# Patient Record
Sex: FEMALE | Race: WHITE | Employment: FULL TIME | URBAN - METROPOLITAN AREA
[De-identification: names, ages, dates, MRNs, and addresses within clinical notes are randomized per-mention and may not be internally consistent; named-entity substitution may affect disease eponyms.]

---

## 2022-12-12 NOTE — PROGRESS NOTES
Binta Munroe (: 1970) is a 45 y.o. female, patient, here for evaluation of the following chief complaint(s):  Shoulder Pain (right)       HPI:    She began having increased right shoulder pain approximately 2 months ago. The patient reports no specific injury but states that her pain came on suddenly. She describes her right shoulder pain as moderate, sharp, and intermittent. Her right shoulder pain continues to get worse. She reports taking no medication for discomfort. She was not seen in the emergency room for right shoulder pain and reports no previous or related right shoulder surgery. Allergies   Allergen Reactions    Darvocet A500 [Propoxyphene N-Acetaminophen] Nausea Only       Current Outpatient Medications   Medication Sig    methylPREDNISolone (MEDROL DOSEPACK) 4 mg tablet Per dose pack instructions    HYDROcodone-acetaminophen (VICODIN) 5-500 mg per tablet Take 1-2 Tabs by mouth every four (4) hours as needed for Pain.    sodium bicarb-sodium chloride (NEILMED SINUS) Kit 1 Bottle by Nasal route two (2) times a day. Use as directed with distilled water - no need to boil    carvedilol (COREG) 3.125 mg tablet Take 3.125 mg by mouth two (2) times daily (with meals). No current facility-administered medications for this visit. Past Medical History:   Diagnosis Date    Chronic HF (heart failure) (HCC)     cardio myopathy    Other ill-defined conditions(799.89)     amniotic embolism furing child birth        Past Surgical History:   Procedure Laterality Date    HX ORTHOPAEDIC      knee, tendon, wrist       History reviewed. No pertinent family history.      Social History     Socioeconomic History    Marital status:      Spouse name: Not on file    Number of children: Not on file    Years of education: Not on file    Highest education level: Not on file   Occupational History    Not on file   Tobacco Use    Smoking status: Never    Smokeless tobacco: Not on file Substance and Sexual Activity    Alcohol use: Yes     Alcohol/week: 0.8 standard drinks     Types: 1 Glasses of wine per week     Comment: daily    Drug use: No    Sexual activity: Not on file   Other Topics Concern    Not on file   Social History Narrative    Not on file     Social Determinants of Health     Financial Resource Strain: Not on file   Food Insecurity: Not on file   Transportation Needs: Not on file   Physical Activity: Not on file   Stress: Not on file   Social Connections: Not on file   Intimate Partner Violence: Not on file   Housing Stability: Not on file       Review of Systems   All other systems reviewed and are negative. Vitals:  Ht 5' 3\" (1.6 m)   Wt 115 lb (52.2 kg)   BMI 20.37 kg/m²    Body mass index is 20.37 kg/m². Ortho Exam     The patient is well-developed and well-nourished. The patient presents today in alert and oriented x3 with a normal mood and affect. The patient stands with a normal weightbearing line and walks with a normal gait. Right shoulder, the patient sits with the scapula protracted and depressed. They are tender to palpation over the trapezius and rhomboids as well as the anterior aspect of the supraspinatus and biceps. They are non-tender to palpation over the neck, clavicle, scapula, and elbow. There is no soft tissue swelling and eccymosis. The patient has limited range of motion of the shoulder in all planes secondary to discomfort. The patient is unable to tolerate stability testing. They have 5/5 strength at their side, and are neurovascularly intact distally. There is no erythema, warmth or skin lesions present. ASSESSMENT/PLAN:      1. Chronic right shoulder pain  -     XR SHOULDER RT AP/LAT MIN 2 V; Future  -     methylPREDNISolone (MEDROL DOSEPACK) 4 mg tablet; Per dose pack instructions, Normal, Disp-1 Dose Pack, R-0  2.  Pain of right shoulder region  -     XR SHOULDER RT AP/LAT MIN 2 V; Future  -     methylPREDNISolone (MEDROL DOSEPACK) 4 mg tablet; Per dose pack instructions, Normal, Disp-1 Dose Pack, R-0  3. Adhesive capsulitis of right shoulder  -     MRI SHOULDER RT WO CONT; Future  -     REFERRAL TO PHYSICAL THERAPY  4. Calcific tendonitis of right shoulder  -     MRI SHOULDER RT WO CONT; Future  -     REFERRAL TO PHYSICAL THERAPY     XR Results (most recent):  Results from Appointment encounter on 12/13/22    XR SHOULDER RT AP/LAT MIN 2 V    Narrative  Right shoulder 3 view x-ray show no evidence of fracture or dislocation. No evidence of significant impingement or AC joint arthritis. Evidence of a calcific tendinitis deposit in her supraspinatus. Below is the assessment and plan developed based on review of pertinent history, physical exam, labs, studies, and medications. **The patient was referred to formal physical therapy. **    We discussed the patient's right shoulder pain and her signs, symptoms, physical exam, description of her pain, and x-rays are consistent with adhesive capsulitis and a calcific tendinitis deposit in her supraspinatus tendon. The possible treatment options were discussed with the patient and because of the over 2-month long duration of her increased and worsening pain, no improvement with multiple modalities of conservative management including an at-home exercise program, her physical exam, description of her pain, x-rays, and her inability to complete daily living activities without significant discomfort we elected to obtain an MRI of her right shoulder to further evaluate the severity of her adhesive capsulitis and calcific tendinitis. The MRI images and results will be used in preoperative planning if and almost certainly when surgical intervention is necessary. The risks and benefits of the MRI were discussed in detail with the patient and she would like to proceed. We will schedule this at her convenience.   I will see her back after MRI is complete to discuss the images, results, and further treatment options. In the interim, I did encourage her to ice when possible, modify her activity level based on her right shoulder pain, and use her Medrol Dosepak as directed. The patient was given a prescription for Medrol Dosepak which she will use as instructed. While taking her Medrol Dosepak, the patient discontinue use of anti-inflammatory medication. Once her Medrol Dosepak is complete, she may resume anti-inflammatories at that time. The patient was also referred to formal physical therapy to work on range of motion, strengthening, and stretching exercises. She will also work on these exercises with an at-home exercise program as pain tolerates. I will see her back as noted above after right shoulder MRI is complete. **We will obtain an MRI for more information to determine the best treatment plan moving forward and help us prepare for surgical intervention if necessary. **    Return for After her right shoulder MRI is complete. An electronic signature was used to authenticate this note.   -- Mirta Newman MD

## 2022-12-13 ENCOUNTER — OFFICE VISIT (OUTPATIENT)
Dept: ORTHOPEDIC SURGERY | Age: 52
End: 2022-12-13
Payer: COMMERCIAL

## 2022-12-13 VITALS — WEIGHT: 115 LBS | BODY MASS INDEX: 20.38 KG/M2 | HEIGHT: 63 IN

## 2022-12-13 DIAGNOSIS — M25.511 CHRONIC RIGHT SHOULDER PAIN: Primary | ICD-10-CM

## 2022-12-13 DIAGNOSIS — M25.511 PAIN OF RIGHT SHOULDER REGION: ICD-10-CM

## 2022-12-13 DIAGNOSIS — G89.29 CHRONIC RIGHT SHOULDER PAIN: Primary | ICD-10-CM

## 2022-12-13 DIAGNOSIS — M75.01 ADHESIVE CAPSULITIS OF RIGHT SHOULDER: ICD-10-CM

## 2022-12-13 DIAGNOSIS — M75.31 CALCIFIC TENDONITIS OF RIGHT SHOULDER: ICD-10-CM

## 2022-12-13 PROCEDURE — 99214 OFFICE O/P EST MOD 30 MIN: CPT | Performed by: ORTHOPAEDIC SURGERY

## 2022-12-13 RX ORDER — METHYLPREDNISOLONE 4 MG/1
TABLET ORAL
Qty: 1 DOSE PACK | Refills: 0 | Status: SHIPPED | OUTPATIENT
Start: 2022-12-13

## 2022-12-21 ENCOUNTER — HOSPITAL ENCOUNTER (OUTPATIENT)
Dept: MRI IMAGING | Age: 52
Discharge: HOME | End: 2022-12-21
Attending: ORTHOPAEDIC SURGERY
Payer: COMMERCIAL

## 2022-12-21 DIAGNOSIS — M75.31 CALCIFIC TENDONITIS OF RIGHT SHOULDER: ICD-10-CM

## 2022-12-21 DIAGNOSIS — M75.01 ADHESIVE CAPSULITIS OF RIGHT SHOULDER: ICD-10-CM

## 2022-12-21 PROCEDURE — 73221 MRI JOINT UPR EXTREM W/O DYE: CPT

## 2022-12-22 NOTE — PROGRESS NOTES
Kalyani Kilpatrick (: 1970) is a 45 y.o. female, patient, here for evaluation of the following chief complaint(s):  Shoulder Pain (Right, mri results)       HPI:    She was last seen for right shoulder pain on 2022. Since then, the patient did have an MRI performed on her right shoulder on 2022. The patient states that her pain level is the same, but her range of motion has slightly improved since her last visit. She rates the severity of her right shoulder pain as an 8. She continues to endorse the pain is sharp and aching and comes and goes depending on her activity with the shoulder. It does still wake her up from sleep .she started taking her Medrol Dosepak a couple of days ago and has been to a couple of formal therapy sessions. Right shoulder MRI images and results were independently reviewed from an outside facility and they were consistent with findings consistent with adhesive capsulitis. Mild bursal sided tendinopathy of the supraspinatus and superior subscapularis without full-thickness or partial-thickness tear noted. Allergies   Allergen Reactions    Darvocet A500 [Propoxyphene N-Acetaminophen] Nausea Only       Current Outpatient Medications   Medication Sig    methylPREDNISolone (MEDROL DOSEPACK) 4 mg tablet Per dose pack instructions    HYDROcodone-acetaminophen (VICODIN) 5-500 mg per tablet Take 1-2 Tabs by mouth every four (4) hours as needed for Pain.    sodium bicarb-sodium chloride (NEILMED SINUS) Kit 1 Bottle by Nasal route two (2) times a day. Use as directed with distilled water - no need to boil    carvedilol (COREG) 3.125 mg tablet Take 3.125 mg by mouth two (2) times daily (with meals). No current facility-administered medications for this visit.        Past Medical History:   Diagnosis Date    Chronic HF (heart failure) (HCC)     cardio myopathy    Other ill-defined conditions(625.27)     amniotic embolism furing child birth        Past Surgical History:   Procedure Laterality Date    HX ORTHOPAEDIC      knee, tendon, wrist       No family history on file. Social History     Socioeconomic History    Marital status:      Spouse name: Not on file    Number of children: Not on file    Years of education: Not on file    Highest education level: Not on file   Occupational History    Not on file   Tobacco Use    Smoking status: Never    Smokeless tobacco: Not on file   Substance and Sexual Activity    Alcohol use: Yes     Alcohol/week: 0.8 standard drinks     Types: 1 Glasses of wine per week     Comment: daily    Drug use: No    Sexual activity: Not on file   Other Topics Concern    Not on file   Social History Narrative    Not on file     Social Determinants of Health     Financial Resource Strain: Not on file   Food Insecurity: Not on file   Transportation Needs: Not on file   Physical Activity: Not on file   Stress: Not on file   Social Connections: Not on file   Intimate Partner Violence: Not on file   Housing Stability: Not on file       Review of Systems   All other systems reviewed and are negative. Vitals: There were no vitals taken for this visit. There is no height or weight on file to calculate BMI. Ortho Exam     The patient is well-developed and well-nourished. The patient presents today in alert and oriented x3 with a normal mood and affect. The patient stands with a normal weightbearing line and walks with a normal gait. Right shoulder active motion is 130 forward flexion, 65 degrees external rotation with the arm abducted to 90 degrees, and internally rotates to her waistline. Passively, she tolerates a few more degrees of motion in each plane. They are tender to palpation over the trapezius and rhomboids as well as the anterior aspect of the supraspinatus and biceps. They are non-tender to palpation over the neck, clavicle, scapula, and elbow. There is no soft tissue swelling and eccymosis.  The patient has limited range of motion of the shoulder in all planes secondary to discomfort. They have 5/5 strength at their side, and are neurovascularly intact distally. There is no erythema, warmth or skin lesions present. ASSESSMENT/PLAN:      1. Chronic right shoulder pain  2. Pain of right shoulder region  3. Adhesive capsulitis of right shoulder  4. Calcific tendonitis of right shoulder  5. Rotator cuff tendinitis, right     Below is the assessment and plan developed based on review of pertinent history, physical exam, labs, studies, and medications. We discussed the patient's ongoing right shoulder pain and we independently reviewed her MRI images and results and they were consistent with findings consistent with adhesive capsulitis. Mild bursal sided tendinopathy of the supraspinatus and superior subscapularis without full-thickness or partial-thickness tear noted. The possible treatment options were discussed with the patient and it was decided to continue with conservative measures. She just recently started the Medrol Dosepak and has had a few sessions of formal therapy, she thinks her range of motion is beginning to improve. We will reassess at her follow-up visit and discussion of management at that time. Return in about 4 weeks (around 1/20/2023). An electronic signature was used to authenticate this note.   -- Guillermina Rodriguez MD

## 2022-12-23 ENCOUNTER — OFFICE VISIT (OUTPATIENT)
Dept: ORTHOPEDIC SURGERY | Age: 52
End: 2022-12-23
Payer: COMMERCIAL

## 2022-12-23 DIAGNOSIS — M25.511 PAIN OF RIGHT SHOULDER REGION: ICD-10-CM

## 2022-12-23 DIAGNOSIS — M75.31 CALCIFIC TENDONITIS OF RIGHT SHOULDER: ICD-10-CM

## 2022-12-23 DIAGNOSIS — M75.01 ADHESIVE CAPSULITIS OF RIGHT SHOULDER: ICD-10-CM

## 2022-12-23 DIAGNOSIS — M25.511 CHRONIC RIGHT SHOULDER PAIN: Primary | ICD-10-CM

## 2022-12-23 DIAGNOSIS — G89.29 CHRONIC RIGHT SHOULDER PAIN: Primary | ICD-10-CM

## 2022-12-23 DIAGNOSIS — M75.81 ROTATOR CUFF TENDINITIS, RIGHT: ICD-10-CM
